# Patient Record
Sex: FEMALE | Race: WHITE | NOT HISPANIC OR LATINO | URBAN - METROPOLITAN AREA
[De-identification: names, ages, dates, MRNs, and addresses within clinical notes are randomized per-mention and may not be internally consistent; named-entity substitution may affect disease eponyms.]

---

## 2024-11-08 ENCOUNTER — EMERGENCY (EMERGENCY)
Facility: HOSPITAL | Age: 34
LOS: 1 days | Discharge: HOME | End: 2024-11-08
Attending: EMERGENCY MEDICINE
Payer: COMMERCIAL

## 2024-11-08 VITALS
HEIGHT: 60 IN | RESPIRATION RATE: 20 BRPM | TEMPERATURE: 97 F | OXYGEN SATURATION: 97 % | HEART RATE: 110 BPM | WEIGHT: 173.94 LBS | DIASTOLIC BLOOD PRESSURE: 84 MMHG | SYSTOLIC BLOOD PRESSURE: 116 MMHG

## 2024-11-08 VITALS
TEMPERATURE: 98 F | RESPIRATION RATE: 18 BRPM | SYSTOLIC BLOOD PRESSURE: 114 MMHG | DIASTOLIC BLOOD PRESSURE: 72 MMHG | OXYGEN SATURATION: 96 % | HEART RATE: 98 BPM

## 2024-11-08 LAB
ADD ON TEST-SPECIMEN IN LAB: SIGNIFICANT CHANGE UP
ALBUMIN SERPL ELPH-MCNC: 4.6 G/DL — SIGNIFICANT CHANGE UP (ref 3.3–5)
ALP SERPL-CCNC: 91 U/L — SIGNIFICANT CHANGE UP (ref 40–120)
ALT FLD-CCNC: 54 U/L — HIGH (ref 10–45)
ANION GAP SERPL CALC-SCNC: 16 MMOL/L — SIGNIFICANT CHANGE UP (ref 5–17)
AST SERPL-CCNC: 28 U/L — SIGNIFICANT CHANGE UP (ref 10–40)
BILIRUB SERPL-MCNC: 0.2 MG/DL — SIGNIFICANT CHANGE UP (ref 0.2–1.2)
BUN SERPL-MCNC: 13 MG/DL — SIGNIFICANT CHANGE UP (ref 7–23)
CALCIUM SERPL-MCNC: 9.7 MG/DL — SIGNIFICANT CHANGE UP (ref 8.4–10.5)
CHLORIDE SERPL-SCNC: 100 MMOL/L — SIGNIFICANT CHANGE UP (ref 96–108)
CO2 SERPL-SCNC: 23 MMOL/L — SIGNIFICANT CHANGE UP (ref 22–31)
CREAT SERPL-MCNC: 0.64 MG/DL — SIGNIFICANT CHANGE UP (ref 0.5–1.3)
EGFR: 119 ML/MIN/1.73M2 — SIGNIFICANT CHANGE UP
GLUCOSE SERPL-MCNC: 124 MG/DL — HIGH (ref 70–99)
HCT VFR BLD CALC: 39.8 % — SIGNIFICANT CHANGE UP (ref 34.5–45)
HGB BLD-MCNC: 13.2 G/DL — SIGNIFICANT CHANGE UP (ref 11.5–15.5)
MCHC RBC-ENTMCNC: 27.4 PG — SIGNIFICANT CHANGE UP (ref 27–34)
MCHC RBC-ENTMCNC: 33.2 G/DL — SIGNIFICANT CHANGE UP (ref 32–36)
MCV RBC AUTO: 82.7 FL — SIGNIFICANT CHANGE UP (ref 80–100)
NRBC # BLD: 0 /100 WBCS — SIGNIFICANT CHANGE UP (ref 0–0)
PLATELET # BLD AUTO: 295 K/UL — SIGNIFICANT CHANGE UP (ref 150–400)
POTASSIUM SERPL-MCNC: 4 MMOL/L — SIGNIFICANT CHANGE UP (ref 3.5–5.3)
POTASSIUM SERPL-SCNC: 4 MMOL/L — SIGNIFICANT CHANGE UP (ref 3.5–5.3)
PROT SERPL-MCNC: 8.7 G/DL — HIGH (ref 6–8.3)
RBC # BLD: 4.81 M/UL — SIGNIFICANT CHANGE UP (ref 3.8–5.2)
RBC # FLD: 13 % — SIGNIFICANT CHANGE UP (ref 10.3–14.5)
SODIUM SERPL-SCNC: 139 MMOL/L — SIGNIFICANT CHANGE UP (ref 135–145)
WBC # BLD: 10.17 K/UL — SIGNIFICANT CHANGE UP (ref 3.8–10.5)
WBC # FLD AUTO: 10.17 K/UL — SIGNIFICANT CHANGE UP (ref 3.8–10.5)

## 2024-11-08 PROCEDURE — 99284 EMERGENCY DEPT VISIT MOD MDM: CPT

## 2024-11-08 PROCEDURE — 96374 THER/PROPH/DIAG INJ IV PUSH: CPT

## 2024-11-08 PROCEDURE — 85027 COMPLETE CBC AUTOMATED: CPT

## 2024-11-08 PROCEDURE — 80053 COMPREHEN METABOLIC PANEL: CPT

## 2024-11-08 PROCEDURE — 99284 EMERGENCY DEPT VISIT MOD MDM: CPT | Mod: 25

## 2024-11-08 PROCEDURE — 84702 CHORIONIC GONADOTROPIN TEST: CPT

## 2024-11-08 PROCEDURE — 96375 TX/PRO/DX INJ NEW DRUG ADDON: CPT

## 2024-11-08 RX ORDER — BUSPIRONE HYDROCHLORIDE 15 MG/1
10 TABLET ORAL ONCE
Refills: 0 | Status: COMPLETED | OUTPATIENT
Start: 2024-11-08 | End: 2024-11-08

## 2024-11-08 RX ORDER — ONDANSETRON HYDROCHLORIDE 2 MG/ML
4 INJECTION, SOLUTION INTRAMUSCULAR; INTRAVENOUS ONCE
Refills: 0 | Status: COMPLETED | OUTPATIENT
Start: 2024-11-08 | End: 2024-11-08

## 2024-11-08 RX ORDER — SODIUM CHLORIDE 9 MG/ML
1000 INJECTION, SOLUTION INTRAMUSCULAR; INTRAVENOUS; SUBCUTANEOUS ONCE
Refills: 0 | Status: COMPLETED | OUTPATIENT
Start: 2024-11-08 | End: 2024-11-08

## 2024-11-08 RX ORDER — LORAZEPAM 2 MG
1 TABLET ORAL ONCE
Refills: 0 | Status: DISCONTINUED | OUTPATIENT
Start: 2024-11-08 | End: 2024-11-08

## 2024-11-08 RX ADMIN — Medication 1 MILLIGRAM(S): at 21:47

## 2024-11-08 RX ADMIN — SODIUM CHLORIDE 1000 MILLILITER(S): 9 INJECTION, SOLUTION INTRAMUSCULAR; INTRAVENOUS; SUBCUTANEOUS at 21:47

## 2024-11-08 RX ADMIN — ONDANSETRON HYDROCHLORIDE 4 MILLIGRAM(S): 2 INJECTION, SOLUTION INTRAMUSCULAR; INTRAVENOUS at 21:47

## 2024-11-08 RX ADMIN — BUSPIRONE HYDROCHLORIDE 10 MILLIGRAM(S): 15 TABLET ORAL at 23:16

## 2024-11-08 NOTE — ED PROVIDER NOTE - NSFOLLOWUPINSTRUCTIONS_ED_ALL_ED_FT
Today in the emergency department you were evaluated for dizziness.  This is likely caused by your viral infection.  For your dizziness we sent meclizine to your pharmacy.  You can take this every 8 hours as needed for your dizziness.  Please also take acetaminophen and ibuprofen as needed for fevers and your viral symptoms.  Please continue to stay well-hydrated and take all of your other medications as prescribed.    Please follow up with your primary care physician within 1-2 weeks of discharge from the emergency department.  Please bring a copy of your results with you.  Please return to the emergency department for worsening of your symptoms.    You may take Acetaminophen over the counter as needed for pain and/or fever. Use as directed and see medication warnings.  You may take Ibuprofen over the counter as needed for pain and/or fever. Use as directed and see medication warnings.

## 2024-11-08 NOTE — ED ADULT TRIAGE NOTE - CHIEF COMPLAINT QUOTE
vertigo and nausea/vomiting since 645am today  went to Urgent Care, prescribed Antivert and Zofran, vomits up medication

## 2024-11-08 NOTE — ED ADULT NURSE NOTE - NSFALLRISKINTERV_ED_ALL_ED

## 2024-11-08 NOTE — ED PROVIDER NOTE - CLINICAL SUMMARY MEDICAL DECISION MAKING FREE TEXT BOX
Vania Villasenor MD  33-year-old female with no past medical history presents to the emergency department with dizziness that started this morning when she woke up.  She has been having an upper respiratory infection for the last couple days with a sick contact at home from her children.  Today when she woke up she finds it very difficult to keep her eyes open because she gets very dizzy she improves when she closes her eyes, there is no ataxia, no neck stiffness, generalized weakness but no specific when it is on 1 side compared to the other.  On exam she has some horizontal mid flatness no rotatory nystagmus otherwise normal neurological exam concern for labyrinthitis  in the context of a recent upper respiratory infection.  Plan is Zofran, Ativan since the patient is feeling nauseous rather than giving p.o. meclizine at this time, will reevaluate and decide on giving p.o. steroids

## 2024-11-08 NOTE — ED PROVIDER NOTE - PATIENT PORTAL LINK FT
You can access the FollowMyHealth Patient Portal offered by [facility] by registering at the following website: [email or web address]. By joining [employer]’s FollowMyHealth portal, you will also be able to view your health information using other applications (apps) compatible with our system.

## 2024-11-08 NOTE — ED ADULT NURSE NOTE - LAST KNOWN WELL DATE/TIME
Person calling: Angelica Wright      Callback phone number: [phone]      Detailed reason for call: Calling for pathology report patient is very anxious       07-Nov-2024 00:00

## 2024-11-08 NOTE — ED PROVIDER NOTE - PROGRESS NOTE DETAILS
Ashish PGY3 - patient feeling improved after medications.  Lab work grossly unremarkable, dispo to home with PMD

## 2024-11-08 NOTE — ED PROVIDER NOTE - ATTENDING CONTRIBUTION TO CARE
I performed a history and physical exam of the patient and discussed their management with the resident. I reviewed the resident's note and agree with the documented findings and plan of care.  Vania Villasenor MD  see MDM

## 2024-11-08 NOTE — ED ADULT NURSE NOTE - OBJECTIVE STATEMENT
Pt is 33y F with PMH anxiety on wellbutrin, recent increase from 150mg to 300mg, complaining of dizziness. Pt reports onset of dizziness upon waking up this morning, with multiple episodes of NBNB emesis. Reported to urgent care, where she was given antivert and zofran, but unable to tolerate PO medications. Reports last zofran dose approximately 1930. Instructed to report to ED for further evaluation. Reports onset of nasal congestion, dry cough few days ago, with sick family contact. Denies trauma to head. Upon assessment, A&Ox4, endorses photophobia, dizziness upon ambulation, nausea, emesis. Denies chest pain, SOB, abdominal pain, n/v. Vitals WNL. PERRLA 3mm.

## 2024-11-09 RX ORDER — MECLIZINE HCL 25 MG
1 TABLET ORAL
Qty: 15 | Refills: 0
Start: 2024-11-09 | End: 2024-11-13

## 2025-06-04 ENCOUNTER — ASOB RESULT (OUTPATIENT)
Age: 35
End: 2025-06-04

## 2025-06-04 ENCOUNTER — APPOINTMENT (OUTPATIENT)
Dept: ANTEPARTUM | Facility: CLINIC | Age: 35
End: 2025-06-04
Payer: COMMERCIAL

## 2025-06-04 PROBLEM — Z00.00 ENCOUNTER FOR PREVENTIVE HEALTH EXAMINATION: Status: ACTIVE | Noted: 2025-06-04

## 2025-06-04 PROCEDURE — 76813 OB US NUCHAL MEAS 1 GEST: CPT

## 2025-06-04 PROCEDURE — 76801 OB US < 14 WKS SINGLE FETUS: CPT

## 2025-07-30 ENCOUNTER — ASOB RESULT (OUTPATIENT)
Age: 35
End: 2025-07-30

## 2025-07-30 ENCOUNTER — APPOINTMENT (OUTPATIENT)
Dept: ANTEPARTUM | Facility: CLINIC | Age: 35
End: 2025-07-30
Payer: COMMERCIAL

## 2025-07-30 PROCEDURE — 76811 OB US DETAILED SNGL FETUS: CPT | Mod: 26
